# Patient Record
Sex: FEMALE | Race: WHITE | NOT HISPANIC OR LATINO | Employment: STUDENT | URBAN - METROPOLITAN AREA
[De-identification: names, ages, dates, MRNs, and addresses within clinical notes are randomized per-mention and may not be internally consistent; named-entity substitution may affect disease eponyms.]

---
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
## 2025-06-12 ENCOUNTER — HOSPITAL ENCOUNTER (EMERGENCY)
Facility: HOSPITAL | Age: 25
Discharge: HOME | End: 2025-06-12
Attending: EMERGENCY MEDICINE
Payer: COMMERCIAL

## 2025-06-12 VITALS
BODY MASS INDEX: 27.55 KG/M2 | RESPIRATION RATE: 17 BRPM | HEART RATE: 71 BPM | SYSTOLIC BLOOD PRESSURE: 110 MMHG | WEIGHT: 149.69 LBS | TEMPERATURE: 98 F | HEIGHT: 62 IN | OXYGEN SATURATION: 98 % | DIASTOLIC BLOOD PRESSURE: 68 MMHG

## 2025-06-12 DIAGNOSIS — N20.0 KIDNEY STONE: ICD-10-CM

## 2025-06-12 DIAGNOSIS — R10.13 EPIGASTRIC PAIN: Primary | ICD-10-CM

## 2025-06-12 DIAGNOSIS — R11.2 NAUSEA AND VOMITING, UNSPECIFIED VOMITING TYPE: ICD-10-CM

## 2025-06-12 LAB
ABSOLUTE EOSINOPHIL (OHS): 0.18 K/UL
ABSOLUTE MONOCYTE (OHS): 1.01 K/UL (ref 0.3–1)
ABSOLUTE NEUTROPHIL COUNT (OHS): 6.62 K/UL (ref 1.8–7.7)
ALBUMIN SERPL BCP-MCNC: 4.1 G/DL (ref 3.5–5.2)
ALP SERPL-CCNC: 78 UNIT/L (ref 40–150)
ALT SERPL W/O P-5'-P-CCNC: 71 UNIT/L (ref 10–44)
ANION GAP (OHS): 10 MMOL/L (ref 8–16)
AST SERPL-CCNC: 83 UNIT/L (ref 11–45)
B-HCG UR QL: NEGATIVE
BACTERIA #/AREA URNS HPF: ABNORMAL /HPF
BASOPHILS # BLD AUTO: 0.05 K/UL
BASOPHILS NFR BLD AUTO: 0.4 %
BILIRUB SERPL-MCNC: 0.4 MG/DL (ref 0.1–1)
BILIRUB UR QL STRIP.AUTO: NEGATIVE
BUN SERPL-MCNC: 16 MG/DL (ref 6–20)
CALCIUM SERPL-MCNC: 8.8 MG/DL (ref 8.7–10.5)
CHLORIDE SERPL-SCNC: 108 MMOL/L (ref 95–110)
CLARITY UR: ABNORMAL
CO2 SERPL-SCNC: 20 MMOL/L (ref 23–29)
COLOR UR AUTO: YELLOW
CREAT SERPL-MCNC: 0.7 MG/DL (ref 0.5–1.4)
ERYTHROCYTE [DISTWIDTH] IN BLOOD BY AUTOMATED COUNT: 13.2 % (ref 11.5–14.5)
GFR SERPLBLD CREATININE-BSD FMLA CKD-EPI: >60 ML/MIN/1.73/M2
GLUCOSE SERPL-MCNC: 124 MG/DL (ref 70–110)
GLUCOSE UR QL STRIP: NEGATIVE
HCT VFR BLD AUTO: 41 % (ref 37–48.5)
HGB BLD-MCNC: 14 GM/DL (ref 12–16)
HGB UR QL STRIP: NEGATIVE
HOLD SPECIMEN: NORMAL
HOLD SPECIMEN: NORMAL
IMM GRANULOCYTES # BLD AUTO: 0.04 K/UL (ref 0–0.04)
IMM GRANULOCYTES NFR BLD AUTO: 0.4 % (ref 0–0.5)
KETONES UR QL STRIP: NEGATIVE
LEUKOCYTE ESTERASE UR QL STRIP: ABNORMAL
LIPASE SERPL-CCNC: 14 U/L (ref 4–60)
LYMPHOCYTES # BLD AUTO: 3.28 K/UL (ref 1–4.8)
MCH RBC QN AUTO: 30 PG (ref 27–31)
MCHC RBC AUTO-ENTMCNC: 34.1 G/DL (ref 32–36)
MCV RBC AUTO: 88 FL (ref 82–98)
MICROSCOPIC COMMENT: ABNORMAL
NITRITE UR QL STRIP: POSITIVE
NUCLEATED RBC (/100WBC) (OHS): 0 /100 WBC
PH UR STRIP: 6 [PH]
PLATELET # BLD AUTO: 275 K/UL (ref 150–450)
PMV BLD AUTO: 10.7 FL (ref 9.2–12.9)
POTASSIUM SERPL-SCNC: 4 MMOL/L (ref 3.5–5.1)
PROT SERPL-MCNC: 7.6 GM/DL (ref 6–8.4)
PROT UR QL STRIP: NEGATIVE
RBC # BLD AUTO: 4.66 M/UL (ref 4–5.4)
RELATIVE EOSINOPHIL (OHS): 1.6 %
RELATIVE LYMPHOCYTE (OHS): 29.3 % (ref 18–48)
RELATIVE MONOCYTE (OHS): 9 % (ref 4–15)
RELATIVE NEUTROPHIL (OHS): 59.3 % (ref 38–73)
SODIUM SERPL-SCNC: 138 MMOL/L (ref 136–145)
SP GR UR STRIP: >=1.03
SQUAMOUS #/AREA URNS HPF: 3 /HPF
UROBILINOGEN UR STRIP-ACNC: NEGATIVE EU/DL
WBC # BLD AUTO: 11.18 K/UL (ref 3.9–12.7)
WBC #/AREA URNS HPF: 2 /HPF (ref 0–5)

## 2025-06-12 PROCEDURE — 99285 EMERGENCY DEPT VISIT HI MDM: CPT | Mod: 25,ER

## 2025-06-12 PROCEDURE — 96375 TX/PRO/DX INJ NEW DRUG ADDON: CPT | Mod: ER

## 2025-06-12 PROCEDURE — 85025 COMPLETE CBC W/AUTO DIFF WBC: CPT | Mod: ER | Performed by: EMERGENCY MEDICINE

## 2025-06-12 PROCEDURE — 94761 N-INVAS EAR/PLS OXIMETRY MLT: CPT | Mod: ER

## 2025-06-12 PROCEDURE — 96374 THER/PROPH/DIAG INJ IV PUSH: CPT | Mod: ER

## 2025-06-12 PROCEDURE — 83690 ASSAY OF LIPASE: CPT | Mod: ER | Performed by: EMERGENCY MEDICINE

## 2025-06-12 PROCEDURE — 86803 HEPATITIS C AB TEST: CPT | Performed by: EMERGENCY MEDICINE

## 2025-06-12 PROCEDURE — 63600175 PHARM REV CODE 636 W HCPCS: Mod: ER | Performed by: EMERGENCY MEDICINE

## 2025-06-12 PROCEDURE — 81001 URINALYSIS AUTO W/SCOPE: CPT | Mod: ER | Performed by: EMERGENCY MEDICINE

## 2025-06-12 PROCEDURE — 80053 COMPREHEN METABOLIC PANEL: CPT | Mod: ER | Performed by: EMERGENCY MEDICINE

## 2025-06-12 PROCEDURE — 81025 URINE PREGNANCY TEST: CPT | Mod: ER | Performed by: EMERGENCY MEDICINE

## 2025-06-12 RX ORDER — ONDANSETRON HYDROCHLORIDE 2 MG/ML
4 INJECTION, SOLUTION INTRAVENOUS
Status: COMPLETED | OUTPATIENT
Start: 2025-06-12 | End: 2025-06-12

## 2025-06-12 RX ORDER — MORPHINE SULFATE 4 MG/ML
4 INJECTION, SOLUTION INTRAMUSCULAR; INTRAVENOUS
Refills: 0 | Status: COMPLETED | OUTPATIENT
Start: 2025-06-12 | End: 2025-06-12

## 2025-06-12 RX ADMIN — ONDANSETRON 4 MG: 2 INJECTION INTRAMUSCULAR; INTRAVENOUS at 05:06

## 2025-06-12 RX ADMIN — MORPHINE SULFATE 4 MG: 4 INJECTION INTRAVENOUS at 05:06

## 2025-06-12 NOTE — ED PROVIDER NOTES
Encounter Date: 6/12/2025       History     Chief Complaint   Patient presents with    Abdominal Pain     Upper abd pain.    Vomiting     Patient presents with right upper quadrant right flank pain for the past day with associated vomiting.  The pain is intermittent and comes in waves.  Denies any fever or chills.  Currently denies any nausea.    The history is provided by the patient and a relative.     Review of patient's allergies indicates:  No Known Allergies  Past Medical History:   Diagnosis Date    Alopecia     dx'd 2016    Anxiety     Depression     h/o suicide attempt at 14yo. Taking Wellbutrin/Lexapro.Klonipin     Past Surgical History:   Procedure Laterality Date    APPENDECTOMY      kidney stent      OTHER SURGICAL HISTORY      Left arm surgery     Family History   Problem Relation Name Age of Onset    Depression Maternal Grandmother Diya     Anxiety disorder Maternal Grandmother Diya     Osteoarthritis Maternal Grandmother Diya     Migraines Maternal Grandmother Diya     Thyroid disease Maternal Grandmother Diya         Had a surgery to remove part of thyroid due to mass.     Social History[1]  Review of Systems  As noted in HPI    Physical Exam     Initial Vitals   BP Pulse Resp Temp SpO2   06/12/25 1702 06/12/25 1702 06/12/25 1702 06/12/25 1702 06/12/25 1657   119/78 80 (!) 23 97.7 °F (36.5 °C) 96 %      MAP       --                Physical Exam    Constitutional: She appears well-developed and well-nourished. No distress.   Cardiovascular:  Normal rate and regular rhythm.           No murmur heard.  Pulmonary/Chest: Breath sounds normal. She has no wheezes.   Abdominal: Abdomen is soft.   There is some right upper quadrant right lower chest and right flank tenderness to palpation no rigidity   Musculoskeletal:         General: Normal range of motion.     Neurological: She is alert and oriented to person, place, and time. She has normal strength. No sensory deficit.   Skin: Skin is warm and dry.  No rash noted.         ED Course   Procedures  Labs Reviewed   COMPREHENSIVE METABOLIC PANEL - Abnormal       Result Value    Sodium 138      Potassium 4.0      Chloride 108      CO2 20 (*)     Glucose 124 (*)     BUN 16      Creatinine 0.7      Calcium 8.8      Protein Total 7.6      Albumin 4.1      Bilirubin Total 0.4      ALP 78      AST 83 (*)     ALT 71 (*)     Anion Gap 10      eGFR >60     URINALYSIS, REFLEX TO URINE CULTURE - Abnormal    Color, UA Yellow      Appearance, UA Hazy (*)     pH, UA 6.0      Spec Grav UA >=1.030 (*)     Protein, UA Negative      Glucose, UA Negative      Ketones, UA Negative      Bilirubin, UA Negative      Blood, UA Negative      Nitrites, UA Positive (*)     Urobilinogen, UA Negative      Leukocyte Esterase, UA Trace (*)    CBC WITH DIFFERENTIAL - Abnormal    WBC 11.18      RBC 4.66      HGB 14.0      HCT 41.0      MCV 88      MCH 30.0      MCHC 34.1      RDW 13.2      Platelet Count 275      MPV 10.7      Nucleated RBC 0      Neut % 59.3      Lymph % 29.3      Mono % 9.0      Eos % 1.6      Basophil % 0.4      Imm Grans % 0.4      Neut # 6.62      Lymph # 3.28      Mono # 1.01 (*)     Eos # 0.18      Baso # 0.05      Imm Grans # 0.04     URINALYSIS MICROSCOPIC - Abnormal    WBC, UA 2      Bacteria, UA Few (*)     Squamous Epithelial Cells, UA 3      Microscopic Comment       LIPASE - Normal    Lipase Level 14     PREGNANCY TEST, URINE RAPID - Normal    hCG Qualitative, Urine Negative     HEP C VIRUS HOLD SPECIMEN    Extra Tube Hold for add-ons.     CBC W/ AUTO DIFFERENTIAL    Narrative:     The following orders were created for panel order CBC auto differential.  Procedure                               Abnormality         Status                     ---------                               -----------         ------                     CBC with Differential[[id number]]       Abnormal            Final result                 Please view results for these tests on the individual  orders.   GREY TOP URINE HOLD    Extra Tube Hold for add-ons.     HEPATITIS C ANTIBODY     EKG Readings: (Independently Interpreted)   Sinus rhythm rate of 74, right axis deviation, nonspecific T-wave changes       Imaging Results              CT Renal Stone Study ABD Pelvis WO (Final result)  Result time 06/12/25 19:10:45      Final result by Gregorio Mora MD (06/12/25 19:10:45)                   Impression:      Punctate nonobstructing right renal calculi.  No hydronephrosis.  Postsurgical changes in the right lower quadrant.  .  Recommend follow-up if symptoms persist.    *All CT scans are performed using dose modulation techniques as appropriate to a performed exam including the following: automated exposure control; adjustment of the mA and/or kV according to patient size (this includes techniques or standardized protocols for targeted exams where dose is matched to indication / reason for exam; i.e. extremities or head); use of iterative reconstruction technique.  **If a simple renal cyst (Bosniak class I) is present, no further imaging or follow-up is recommended.    Finalized on: 6/12/2025 7:10 PM By:  Gregorio Mora MD VIKKI PhD  [facility]# [id number]      2025-06-12 19:12:54.030     [facility]               Narrative:    EXAM:  CT RENAL STONE STUDY ABD PELVIS WO    CLINICAL HISTORY: Generalized abdominal pain    Technique: Routine noncontrast CT of the abdomen and pelvis was performed.    Comparison: No prior abdominal CT available for comparison.    FINDINGS:    Punctate nonobstructing right renal calculi.  No hydronephrosis.  No focal liver lesions.  Spleen measures up to 11 cm.  Lung bases are clear.  Pancreas gallbladder are grossly unremarkable.  Mild/moderate amount of stool in the colon.  Postsurgical changes of the right lower quadrant.  The appendix was not identified.  Correlate with history of prior appendectomy.  Mild-to-moderate the muscle the colon.  Uterus and bladder are grossly unremarkable.  No  vertebral body compression deformity.                                         Medications   morphine injection 4 mg (4 mg Intravenous Given 6/12/25 1728)   ondansetron injection 4 mg (4 mg Intravenous Given 6/12/25 1726)     Medical Decision Making  25-year-old female who was initially seen and treated by another ED provider.  Care handed off to me with workup pending.  I discussed all results with the patient at bedside.  On my exam, patient is pain-free at this time.  She describes having acute onset bilateral upper abdominal pain associated with vomiting.  She then came to the emergency department.  Workup did note a nonobstructive right-sided punctate stone.  She is nitrite positive with few bacteria, but denies any dysuria, frequency, fevers.  Acute onset bilateral upper abdominal pain associated with an episode of vomiting, that has now resolved with no other UTI symptoms does not suggest symptomatic UTI.  Will not treat for asymptomatic bacteriuria    Amount and/or Complexity of Data Reviewed  External Data Reviewed: notes.     Details: Past medical history, medications, allergies reviewed  Labs: ordered. Decision-making details documented in ED Course.  Radiology: ordered and independent interpretation performed. Decision-making details documented in ED Course.    Risk  OTC drugs.  Prescription drug management.  Parenteral controlled substances.                                      Clinical Impression:  Final diagnoses:  [R10.13] Epigastric pain (Primary)  [R11.2] Nausea and vomiting, unspecified vomiting type  [N20.0] Kidney stone          ED Disposition Condition    Discharge Stable          ED Prescriptions    None       Follow-up Information       Follow up With Specialties Details Why Contact Info    Hanna Geiger NP Internal Medicine Call   [address] [facility] [address] [phone]      [facility] - Emergency Dept Emergency Medicine  As needed, If symptoms worsen [address]  Emergency  [facility] [address]  [phone]                   [1]   Social History  Tobacco Use    Smoking status: Some Days     Current packs/day: 0.00     Types: Vaping with nicotine, Cigarettes    Smokeless tobacco: Never    Tobacco comments:     E-cigarette/vape. Havent been using for a while, only when im with friends ect.   Substance Use Topics    Alcohol use: Not Currently     Comment: social    Drug use: Never        Clinton Rodriguez MD  06/12/25 1959

## 2025-06-13 ENCOUNTER — RESULTS FOLLOW-UP (OUTPATIENT)
Dept: EMERGENCY MEDICINE | Facility: HOSPITAL | Age: 25
End: 2025-06-13

## 2025-06-13 LAB — HCV AB SERPL QL IA: ABNORMAL

## 2025-06-14 LAB
OHS QRS DURATION: 80 MS
OHS QTC CALCULATION: 419 MS